# Patient Record
Sex: FEMALE | Race: WHITE | NOT HISPANIC OR LATINO
[De-identification: names, ages, dates, MRNs, and addresses within clinical notes are randomized per-mention and may not be internally consistent; named-entity substitution may affect disease eponyms.]

---

## 2021-11-22 PROBLEM — Z00.00 ENCOUNTER FOR PREVENTIVE HEALTH EXAMINATION: Status: ACTIVE | Noted: 2021-11-22

## 2022-03-30 ENCOUNTER — APPOINTMENT (OUTPATIENT)
Dept: ENDOCRINOLOGY | Facility: CLINIC | Age: 77
End: 2022-03-30

## 2022-06-24 ENCOUNTER — APPOINTMENT (OUTPATIENT)
Dept: NEUROLOGY | Facility: CLINIC | Age: 77
End: 2022-06-24

## 2022-07-07 ENCOUNTER — APPOINTMENT (OUTPATIENT)
Dept: ORTHOPEDIC SURGERY | Facility: CLINIC | Age: 77
End: 2022-07-07

## 2022-07-07 VITALS — WEIGHT: 150 LBS | HEIGHT: 61 IN | BODY MASS INDEX: 28.32 KG/M2

## 2022-07-07 DIAGNOSIS — M70.61 TROCHANTERIC BURSITIS, RIGHT HIP: ICD-10-CM

## 2022-07-07 DIAGNOSIS — M48.02 SPINAL STENOSIS, CERVICAL REGION: ICD-10-CM

## 2022-07-07 DIAGNOSIS — M70.62 TROCHANTERIC BURSITIS, LEFT HIP: ICD-10-CM

## 2022-07-07 DIAGNOSIS — M47.22 OTHER SPONDYLOSIS WITH RADICULOPATHY, CERVICAL REGION: ICD-10-CM

## 2022-07-07 DIAGNOSIS — M62.838 OTHER MUSCLE SPASM: ICD-10-CM

## 2022-07-07 DIAGNOSIS — M50.320 OTHER CERVICAL DISC DEGENERATION, MID-CERVICAL REGION, UNSPECIFIED LEVEL: ICD-10-CM

## 2022-07-07 DIAGNOSIS — M47.812 SPONDYLOSIS W/OUT MYELOPATHY OR RADICULOPATHY, CERVICAL REGION: ICD-10-CM

## 2022-07-07 DIAGNOSIS — M51.37 OTHER INTERVERTEBRAL DISC DEGENERATION, LUMBOSACRAL REGION: ICD-10-CM

## 2022-07-07 DIAGNOSIS — M48.061 SPINAL STENOSIS, LUMBAR REGION WITHOUT NEUROGENIC CLAUDICATION: ICD-10-CM

## 2022-07-07 DIAGNOSIS — M51.36 OTHER INTERVERTEBRAL DISC DEGENERATION, LUMBAR REGION: ICD-10-CM

## 2022-07-07 DIAGNOSIS — Z86.03 PERSONAL HISTORY OF NEOPLASM OF UNCERTAIN BEHAVIOR: ICD-10-CM

## 2022-07-07 DIAGNOSIS — M47.817 SPONDYLOSIS W/OUT MYELOPATHY OR RADICULOPATHY, LUMBOSACRAL REGION: ICD-10-CM

## 2022-07-07 PROCEDURE — 99214 OFFICE O/P EST MOD 30 MIN: CPT

## 2022-07-07 PROCEDURE — 99204 OFFICE O/P NEW MOD 45 MIN: CPT

## 2022-07-07 PROCEDURE — 72100 X-RAY EXAM L-S SPINE 2/3 VWS: CPT

## 2022-07-07 RX ORDER — ELECTROLYTE SOLUTION,INJ
VIAL (ML) INTRAVENOUS
Refills: 0 | Status: ACTIVE | COMMUNITY

## 2022-07-07 RX ORDER — PANTOPRAZOLE 40 MG/1
40 TABLET, DELAYED RELEASE ORAL
Refills: 0 | Status: ACTIVE | COMMUNITY

## 2022-07-07 RX ORDER — ROSUVASTATIN CALCIUM 20 MG/1
20 TABLET, FILM COATED ORAL
Refills: 0 | Status: ACTIVE | COMMUNITY

## 2022-07-07 NOTE — ASSESSMENT
[FreeTextEntry1] : Patient given prescription for MRI, follow up after study is completed to discuss results. \par \par Recommend: - NSAID - Heating pad - Muscle relaxer - Core strengthening exercise - Hamstring stretching exercise Patient is given back rehabilitation exercise book. \par \par Patient will begin physical therapy. \par \par Follow up with Rheumatology for osteoporosis treatment.
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
## 2022-09-28 ENCOUNTER — APPOINTMENT (OUTPATIENT)
Dept: ORTHOPEDIC SURGERY | Facility: CLINIC | Age: 77
End: 2022-09-28

## 2022-09-28 DIAGNOSIS — S80.01XA CONTUSION OF RIGHT KNEE, INITIAL ENCOUNTER: ICD-10-CM

## 2022-09-28 PROCEDURE — 99214 OFFICE O/P EST MOD 30 MIN: CPT

## 2022-09-28 PROCEDURE — 99072 ADDL SUPL MATRL&STAF TM PHE: CPT

## 2022-09-28 NOTE — HISTORY OF PRESENT ILLNESS
[de-identified] : Patient is here today for the right knee pain.  She injured the Right knee Right elbow and Right foot on 9/7/22 s/p trip and fall at work.  She denies prior injury. She went to urgent care and had xrays. She states the foot is frx and the Right elbow is feeling better but she still notes severe Right knee pain . Pain is mostly medial prox tibial area worse with flexion of the knee. She states she has been trying to walk on it.

## 2022-09-28 NOTE — WORK
[Was the competent medical cause of the injury] : was the competent medical cause of the injury [Are consistent with the injury] : are consistent with the injury [Consistent with my objective findings] : consistent with my objective findings

## 2022-09-28 NOTE — PHYSICAL EXAM
[Right] : right knee [Outside films reviewed] : Outside films reviewed [FreeTextEntry9] : minimal DJD. no frx noted.

## 2022-11-05 ENCOUNTER — OFFICE (OUTPATIENT)
Dept: URBAN - METROPOLITAN AREA CLINIC 88 | Facility: CLINIC | Age: 77
Setting detail: OPHTHALMOLOGY
End: 2022-11-05
Payer: MEDICARE

## 2022-11-05 DIAGNOSIS — H43.392: ICD-10-CM

## 2022-11-05 DIAGNOSIS — H34.8320: ICD-10-CM

## 2022-11-05 PROCEDURE — 92134 CPTRZ OPH DX IMG PST SGM RTA: CPT | Performed by: OPHTHALMOLOGY

## 2022-11-05 PROCEDURE — 67028 INJECTION EYE DRUG: CPT | Performed by: OPHTHALMOLOGY

## 2022-11-05 ASSESSMENT — KERATOMETRY
OD_K1POWER_DIOPTERS: 44.25
OD_K2POWER_DIOPTERS: 45.00
OS_AXISANGLE_DEGREES: 073
OD_AXISANGLE_DEGREES: 082
OS_K2POWER_DIOPTERS: 45.00
OS_K1POWER_DIOPTERS: 44.50

## 2022-11-05 ASSESSMENT — SPHEQUIV_DERIVED
OS_SPHEQUIV: 1.875
OD_SPHEQUIV: 2.5

## 2022-11-05 ASSESSMENT — REFRACTION_AUTOREFRACTION
OS_CYLINDER: -0.25
OD_CYLINDER: -0.50
OS_AXIS: 084
OD_SPHERE: +2.75
OS_SPHERE: +2.00
OD_AXIS: 112

## 2022-11-05 ASSESSMENT — LID EXAM ASSESSMENTS
OS_TRICHIASIS: LLL T
OS_COMMENTS: CONCRETION UL COMMENTS

## 2022-11-05 ASSESSMENT — AXIALLENGTH_DERIVED
OS_AL: 22.4594
OD_AL: 22.2817

## 2022-11-05 ASSESSMENT — SUPERFICIAL PUNCTATE KERATITIS (SPK)
OD_SPK: 1+
OS_SPK: 1+

## 2022-11-05 ASSESSMENT — VISUAL ACUITY
OS_BCVA: 20/25-2
OD_BCVA: 20/40+1

## 2022-11-05 ASSESSMENT — TONOMETRY
OS_IOP_MMHG: 15
OD_IOP_MMHG: 17

## 2022-11-05 ASSESSMENT — CONFRONTATIONAL VISUAL FIELD TEST (CVF)
OS_FINDINGS: FULL
OD_FINDINGS: FULL

## 2022-11-16 ENCOUNTER — APPOINTMENT (OUTPATIENT)
Dept: ORTHOPEDIC SURGERY | Facility: CLINIC | Age: 77
End: 2022-11-16

## 2022-11-16 DIAGNOSIS — S50.01XA CONTUSION OF RIGHT ELBOW, INITIAL ENCOUNTER: ICD-10-CM

## 2022-11-16 DIAGNOSIS — S80.11XA CONTUSION OF RIGHT LOWER LEG, INITIAL ENCOUNTER: ICD-10-CM

## 2022-11-16 PROCEDURE — 73562 X-RAY EXAM OF KNEE 3: CPT | Mod: RT

## 2022-11-16 PROCEDURE — 99072 ADDL SUPL MATRL&STAF TM PHE: CPT

## 2022-11-16 PROCEDURE — 99214 OFFICE O/P EST MOD 30 MIN: CPT

## 2022-11-16 NOTE — DISCUSSION/SUMMARY
[de-identified] : Patient will continue WBAT and f/u in 6-8 weeks. \par \par xrays today show mild DJD\par no restrictions\par f.u in 2 motnhs\par

## 2022-11-16 NOTE — PHYSICAL EXAM
[NL (150)] : flexion 150 degrees [NL (90)] : supination 90 degrees [NL (140)] : flexion 140 degrees [NL (0)] : extension 0 degrees [5___] : hamstring 5[unfilled]/5 [Right] : right knee [Outside films reviewed] : Outside films reviewed [] : no ecchymosis [FreeTextEntry3] : soft ball sized swelling to the Right med prox tibial region improved since last visit.  [FreeTextEntry9] : minimal DJD. no frx noted.

## 2022-11-16 NOTE — HISTORY OF PRESENT ILLNESS
[de-identified] : following up for the right knee. Patient fell on 9/07/2022  Patient states the Right elbow has improved since injury but she still notes pain medial aspect of the lower leg.  She states swelling has imrpoved. She is ambulating better. She is using epsom salts as well as  Voltaren gel.

## 2023-01-09 ENCOUNTER — APPOINTMENT (OUTPATIENT)
Dept: ORTHOPEDIC SURGERY | Facility: CLINIC | Age: 78
End: 2023-01-09

## 2023-06-24 ENCOUNTER — OFFICE (OUTPATIENT)
Dept: URBAN - METROPOLITAN AREA CLINIC 70 | Facility: CLINIC | Age: 78
Setting detail: OPHTHALMOLOGY
End: 2023-06-24
Payer: MEDICARE

## 2023-06-24 DIAGNOSIS — H25.13: ICD-10-CM

## 2023-06-24 DIAGNOSIS — H16.223: ICD-10-CM

## 2023-06-24 DIAGNOSIS — H16.222: ICD-10-CM

## 2023-06-24 DIAGNOSIS — H16.221: ICD-10-CM

## 2023-06-24 PROCEDURE — 99213 OFFICE O/P EST LOW 20 MIN: CPT | Performed by: OPHTHALMOLOGY

## 2023-06-24 PROCEDURE — 83861 MICROFLUID ANALY TEARS: CPT | Performed by: OPHTHALMOLOGY

## 2023-06-24 ASSESSMENT — REFRACTION_AUTOREFRACTION
OS_CYLINDER: -0.50
OS_AXIS: 097
OS_SPHERE: +2.50
OD_SPHERE: +3.00
OD_AXIS: 099
OD_CYLINDER: -0.50

## 2023-06-24 ASSESSMENT — KERATOMETRY
OS_K1POWER_DIOPTERS: 44.00
OD_AXISANGLE_DEGREES: 102
OS_K2POWER_DIOPTERS: 44.75
OD_K1POWER_DIOPTERS: 44.00
OD_K2POWER_DIOPTERS: 45.50
OS_AXISANGLE_DEGREES: 073

## 2023-06-24 ASSESSMENT — VISUAL ACUITY
OS_BCVA: 20/25
OD_BCVA: 20/60+2

## 2023-06-24 ASSESSMENT — SUPERFICIAL PUNCTATE KERATITIS (SPK)
OD_SPK: 1+
OS_SPK: 1+

## 2023-06-24 ASSESSMENT — TONOMETRY
OS_IOP_MMHG: 10
OD_IOP_MMHG: 12

## 2023-06-24 ASSESSMENT — CONFRONTATIONAL VISUAL FIELD TEST (CVF)
OD_FINDINGS: FULL
OS_FINDINGS: FULL

## 2023-06-24 ASSESSMENT — LID EXAM ASSESSMENTS
OS_TRICHIASIS: LLL T
OS_COMMENTS: CONCRETION UL COMMENTS

## 2023-06-24 ASSESSMENT — SPHEQUIV_DERIVED
OS_SPHEQUIV: 2.25
OD_SPHEQUIV: 2.75

## 2023-06-24 ASSESSMENT — AXIALLENGTH_DERIVED
OD_AL: 22.1546
OS_AL: 22.4518

## 2024-03-14 ENCOUNTER — OFFICE (OUTPATIENT)
Dept: URBAN - METROPOLITAN AREA CLINIC 104 | Facility: CLINIC | Age: 79
Setting detail: OPHTHALMOLOGY
End: 2024-03-14
Payer: MEDICARE

## 2024-03-14 DIAGNOSIS — H52.213: ICD-10-CM

## 2024-03-14 DIAGNOSIS — H16.223: ICD-10-CM

## 2024-03-14 DIAGNOSIS — H43.392: ICD-10-CM

## 2024-03-14 DIAGNOSIS — H25.13: ICD-10-CM

## 2024-03-14 DIAGNOSIS — H34.832: ICD-10-CM

## 2024-03-14 DIAGNOSIS — H25.12: ICD-10-CM

## 2024-03-14 PROBLEM — H25.11 CATARACT SENILE NUCLEAR SCLEROSIS; RIGHT EYE, LEFT EYE, BOTH EYES: Status: ACTIVE | Noted: 2024-03-14

## 2024-03-14 PROCEDURE — 92014 COMPRE OPH EXAM EST PT 1/>: CPT | Performed by: OPHTHALMOLOGY

## 2024-03-14 PROCEDURE — 92134 CPTRZ OPH DX IMG PST SGM RTA: CPT | Performed by: OPHTHALMOLOGY

## 2024-03-14 PROCEDURE — 92136 OPHTHALMIC BIOMETRY: CPT | Mod: 26,LT | Performed by: OPHTHALMOLOGY

## 2024-03-14 PROCEDURE — 92136 OPHTHALMIC BIOMETRY: CPT | Mod: TC | Performed by: OPHTHALMOLOGY

## 2024-03-14 PROCEDURE — 92025 CPTRIZED CORNEAL TOPOGRAPHY: CPT | Performed by: OPHTHALMOLOGY

## 2024-03-14 ASSESSMENT — REFRACTION_CURRENTRX
OS_CYLINDER: SPHERE
OS_ADD: +3.25
OD_OVR_VA: 20/
OS_VPRISM_DIRECTION: BF
OS_OVR_VA: 20/
OD_SPHERE: +2.75
OS_SPHERE: +2.00
OD_CYLINDER: -0.25
OD_ADD: +3.25
OD_VPRISM_DIRECTION: BF
OD_AXIS: 121
OS_AXIS: 000

## 2024-03-14 ASSESSMENT — REFRACTION_MANIFEST
OD_VA1: 20/NI
OD_SPHERE: +2.25
OS_VA1: 20/200
OD_CYLINDER: -0.50
OS_ADD: +2.75
OD_ADD: +2.75
OD_AXIS: 090
OS_AXIS: 120
OS_SPHERE: +0.50
OS_CYLINDER: -1.25

## 2024-03-14 ASSESSMENT — LID EXAM ASSESSMENTS
OS_TRICHIASIS: LLL T
OS_COMMENTS: CONCRETION UL COMMENTS

## 2024-03-14 ASSESSMENT — SPHEQUIV_DERIVED
OS_SPHEQUIV: -0.125
OD_SPHEQUIV: 2

## 2024-04-24 ASSESSMENT — KERATOMETRY
OD_K1POWER_DIOPTERS: 44.06
OS_K1POWER_DIOPTERS: 44.06
OD_AXISANGLE_DEGREES: 091
OS_K2POWER_DIOPTERS: 44.94
OS_AXISANGLE_DEGREES: 079
OD_K2POWER_DIOPTERS: 45.18

## 2024-04-24 ASSESSMENT — REFRACTION_CURRENTRX
OS_CYLINDER: SPHERE
OD_OVR_VA: 20/
OS_OVR_VA: 20/
OD_AXIS: 121
OS_VPRISM_DIRECTION: BF
OS_ADD: +3.25
OS_AXIS: 000
OS_SPHERE: +2.00
OD_VPRISM_DIRECTION: BF
OD_SPHERE: +2.75
OD_CYLINDER: -0.25
OD_ADD: +3.25

## 2024-04-24 ASSESSMENT — REFRACTION_MANIFEST
OD_SPHERE: +2.25
OD_CYLINDER: -0.50
OS_SPHERE: +0.50
OS_AXIS: 120
OS_ADD: +2.75
OD_VA1: 20/NI
OD_AXIS: 090
OS_CYLINDER: -1.25
OD_ADD: +2.75
OS_VA1: 20/200

## 2024-04-24 ASSESSMENT — AXIALLENGTH_DERIVED
OD_AL: 22.46
OS_AL: 23.2775

## 2024-04-24 ASSESSMENT — SPHEQUIV_DERIVED
OD_SPHEQUIV: 2
OS_SPHEQUIV: -0.125

## 2024-04-25 ENCOUNTER — RX ONLY (RX ONLY)
Age: 79
End: 2024-04-25

## 2024-05-06 ENCOUNTER — OFFICE (OUTPATIENT)
Dept: URBAN - METROPOLITAN AREA CLINIC 104 | Facility: CLINIC | Age: 79
Setting detail: OPHTHALMOLOGY
End: 2024-05-06
Payer: MEDICARE

## 2024-05-06 DIAGNOSIS — H25.11: ICD-10-CM

## 2024-05-06 DIAGNOSIS — H25.13: ICD-10-CM

## 2024-05-06 DIAGNOSIS — H25.12: ICD-10-CM

## 2024-05-06 PROCEDURE — 99212 OFFICE O/P EST SF 10 MIN: CPT | Performed by: OPHTHALMOLOGY

## 2024-05-06 ASSESSMENT — LID EXAM ASSESSMENTS
OS_TRICHIASIS: LLL T
OS_COMMENTS: CONCRETION UL COMMENTS

## 2024-06-07 ENCOUNTER — AMBULATORY SURGERY (OUTPATIENT)
Dept: URBAN - METROPOLITAN AREA CLINIC 18 | Facility: CLINIC | Age: 79
Setting detail: OPHTHALMOLOGY
End: 2024-06-07
Payer: MEDICARE

## 2024-06-07 DIAGNOSIS — Z96.1: ICD-10-CM

## 2024-06-07 DIAGNOSIS — H25.12: ICD-10-CM

## 2024-06-07 PROCEDURE — 66984 XCAPSL CTRC RMVL W/O ECP: CPT | Mod: LT | Performed by: OPHTHALMOLOGY

## 2024-06-07 PROCEDURE — 99024 POSTOP FOLLOW-UP VISIT: CPT | Performed by: OPTOMETRIST

## 2024-06-07 ASSESSMENT — LID EXAM ASSESSMENTS
OS_COMMENTS: CONCRETION UL COMMENTS
OS_TRICHIASIS: LLL T

## 2024-06-13 ENCOUNTER — OFFICE (OUTPATIENT)
Dept: URBAN - METROPOLITAN AREA CLINIC 104 | Facility: CLINIC | Age: 79
Setting detail: OPHTHALMOLOGY
End: 2024-06-13
Payer: MEDICARE

## 2024-06-13 DIAGNOSIS — Z96.1: ICD-10-CM

## 2024-06-13 PROBLEM — H16.222 DRY EYE SYNDROME K SICCA;  , LEFT EYE: Status: ACTIVE | Noted: 2024-06-07

## 2024-06-13 PROCEDURE — 99024 POSTOP FOLLOW-UP VISIT: CPT | Performed by: OPHTHALMOLOGY

## 2024-06-13 ASSESSMENT — CONFRONTATIONAL VISUAL FIELD TEST (CVF)
OD_FINDINGS: FULL
OS_FINDINGS: FULL

## 2024-06-13 ASSESSMENT — LID EXAM ASSESSMENTS
OS_TRICHIASIS: LLL T
OS_COMMENTS: CONCRETION UL COMMENTS

## 2024-07-11 ENCOUNTER — OFFICE (OUTPATIENT)
Dept: URBAN - METROPOLITAN AREA CLINIC 104 | Facility: CLINIC | Age: 79
Setting detail: OPHTHALMOLOGY
End: 2024-07-11
Payer: MEDICARE

## 2024-07-11 DIAGNOSIS — Z96.1: ICD-10-CM

## 2024-07-11 DIAGNOSIS — H16.223: ICD-10-CM

## 2024-07-11 DIAGNOSIS — H25.11: ICD-10-CM

## 2024-07-11 PROCEDURE — 99024 POSTOP FOLLOW-UP VISIT: CPT | Performed by: OPHTHALMOLOGY

## 2024-07-11 ASSESSMENT — LID EXAM ASSESSMENTS
OS_COMMENTS: CONCRETION UL COMMENTS
OS_TRICHIASIS: LLL T

## 2024-07-11 ASSESSMENT — CONFRONTATIONAL VISUAL FIELD TEST (CVF)
OD_FINDINGS: FULL
OS_FINDINGS: FULL

## 2024-09-06 ENCOUNTER — AMBUL SURGICAL CARE (OUTPATIENT)
Dept: URBAN - METROPOLITAN AREA CLINIC 18 | Facility: CLINIC | Age: 79
Setting detail: OPHTHALMOLOGY
End: 2024-09-06
Payer: MEDICARE

## 2024-09-06 ENCOUNTER — RX ONLY (RX ONLY)
Age: 79
End: 2024-09-06

## 2024-09-06 DIAGNOSIS — Z96.1: ICD-10-CM

## 2024-09-06 DIAGNOSIS — H25.11: ICD-10-CM

## 2024-09-06 PROBLEM — H25.811 CATARACT SENILE NUCLEAR SCLEROSIS; RIGHT EYE: Status: RESOLVED | Noted: 2024-09-06 | Resolved: 2024-09-06

## 2024-09-06 PROCEDURE — 66984 XCAPSL CTRC RMVL W/O ECP: CPT | Mod: 79,RT | Performed by: OPHTHALMOLOGY

## 2024-09-06 PROCEDURE — 99024 POSTOP FOLLOW-UP VISIT: CPT | Performed by: OPTOMETRIST

## 2024-09-06 ASSESSMENT — LID EXAM ASSESSMENTS
OS_TRICHIASIS: LLL T
OS_COMMENTS: CONCRETION UL COMMENTS

## 2024-09-06 ASSESSMENT — CONFRONTATIONAL VISUAL FIELD TEST (CVF)
OD_FINDINGS: FULL
OS_FINDINGS: FULL

## 2024-09-09 ENCOUNTER — OFFICE (OUTPATIENT)
Dept: URBAN - METROPOLITAN AREA CLINIC 104 | Facility: CLINIC | Age: 79
Setting detail: OPHTHALMOLOGY
End: 2024-09-09
Payer: MEDICARE

## 2024-09-09 DIAGNOSIS — Z96.1: ICD-10-CM

## 2024-09-09 PROCEDURE — 99024 POSTOP FOLLOW-UP VISIT: CPT | Performed by: OPTOMETRIST

## 2024-09-09 ASSESSMENT — LID EXAM ASSESSMENTS
OS_TRICHIASIS: LLL T
OS_COMMENTS: CONCRETION UL COMMENTS

## 2024-09-30 ENCOUNTER — OFFICE (OUTPATIENT)
Dept: URBAN - METROPOLITAN AREA CLINIC 104 | Facility: CLINIC | Age: 79
Setting detail: OPHTHALMOLOGY
End: 2024-09-30
Payer: MEDICARE

## 2024-09-30 DIAGNOSIS — Z96.1: ICD-10-CM

## 2024-09-30 PROCEDURE — 99024 POSTOP FOLLOW-UP VISIT: CPT | Performed by: OPHTHALMOLOGY

## 2024-09-30 ASSESSMENT — CONFRONTATIONAL VISUAL FIELD TEST (CVF)
OD_FINDINGS: FULL
OS_FINDINGS: FULL

## 2024-10-14 ENCOUNTER — OFFICE (OUTPATIENT)
Dept: URBAN - METROPOLITAN AREA CLINIC 104 | Facility: CLINIC | Age: 79
Setting detail: OPHTHALMOLOGY
End: 2024-10-14
Payer: MEDICARE

## 2024-10-14 DIAGNOSIS — Z96.1: ICD-10-CM

## 2024-10-14 PROCEDURE — 99024 POSTOP FOLLOW-UP VISIT: CPT | Performed by: OPHTHALMOLOGY

## 2024-10-14 ASSESSMENT — CONFRONTATIONAL VISUAL FIELD TEST (CVF)
OS_FINDINGS: FULL
OD_FINDINGS: FULL

## 2024-10-14 ASSESSMENT — KERATOMETRY
OD_K2POWER_DIOPTERS: 44.94
OS_K2POWER_DIOPTERS: 44.58
OS_K1POWER_DIOPTERS: 44.18
OD_AXISANGLE_DEGREES: 94
OS_AXISANGLE_DEGREES: 89
OD_K1POWER_DIOPTERS: 44.00

## 2024-10-14 ASSESSMENT — REFRACTION_AUTOREFRACTION
OS_AXIS: 167
OD_CYLINDER: -0.50
OS_SPHERE: -0.25
OS_CYLINDER: -0.50
OD_AXIS: 177
OD_SPHERE: +0.25

## 2024-10-14 ASSESSMENT — TONOMETRY
OD_IOP_MMHG: 10
OS_IOP_MMHG: 10

## 2024-10-14 ASSESSMENT — SUPERFICIAL PUNCTATE KERATITIS (SPK)
OD_SPK: 1+
OS_SPK: 1+

## 2024-10-14 ASSESSMENT — VISUAL ACUITY
OS_BCVA: 20/30
OD_BCVA: 20/40

## 2024-10-14 ASSESSMENT — LID EXAM ASSESSMENTS
OS_COMMENTS: CONCRETION UL COMMENTS
OS_TRICHIASIS: LLL T

## 2024-10-14 ASSESSMENT — CORNEAL DYSTROPHY - POSTERIOR
OD_POSTERIORDYSTROPHY: 1+ GUTTATA
OS_POSTERIORDYSTROPHY: 1+ GUTTATA

## 2024-12-10 ENCOUNTER — APPOINTMENT (OUTPATIENT)
Dept: ORTHOPEDIC SURGERY | Facility: CLINIC | Age: 79
End: 2024-12-10
Payer: MEDICARE

## 2024-12-10 VITALS — BODY MASS INDEX: 28.32 KG/M2 | WEIGHT: 150 LBS | HEIGHT: 61 IN

## 2024-12-10 DIAGNOSIS — M25.562 PAIN IN LEFT KNEE: ICD-10-CM

## 2024-12-10 DIAGNOSIS — Z78.9 OTHER SPECIFIED HEALTH STATUS: ICD-10-CM

## 2024-12-10 DIAGNOSIS — S83.272A COMPLEX TEAR OF LATERAL MENISCUS, CURRENT INJURY, LEFT KNEE, INITIAL ENCOUNTER: ICD-10-CM

## 2024-12-10 DIAGNOSIS — G89.29 PAIN IN LEFT KNEE: ICD-10-CM

## 2024-12-10 DIAGNOSIS — Z86.39 PERSONAL HISTORY OF OTHER ENDOCRINE, NUTRITIONAL AND METABOLIC DISEASE: ICD-10-CM

## 2024-12-10 DIAGNOSIS — S83.232A COMPLEX TEAR OF MEDIAL MENISCUS, CURRENT INJURY, LEFT KNEE, INITIAL ENCOUNTER: ICD-10-CM

## 2024-12-10 DIAGNOSIS — Z87.19 PERSONAL HISTORY OF OTHER DISEASES OF THE DIGESTIVE SYSTEM: ICD-10-CM

## 2024-12-10 DIAGNOSIS — H35.81 RETINAL EDEMA: ICD-10-CM

## 2024-12-10 DIAGNOSIS — M94.262 CHONDROMALACIA, LEFT KNEE: ICD-10-CM

## 2024-12-10 DIAGNOSIS — Z87.891 PERSONAL HISTORY OF NICOTINE DEPENDENCE: ICD-10-CM

## 2024-12-10 DIAGNOSIS — Z87.898 PERSONAL HISTORY OF OTHER SPECIFIED CONDITIONS: ICD-10-CM

## 2024-12-10 DIAGNOSIS — I10 ESSENTIAL (PRIMARY) HYPERTENSION: ICD-10-CM

## 2024-12-10 PROCEDURE — 73564 X-RAY EXAM KNEE 4 OR MORE: CPT | Mod: LT

## 2024-12-10 PROCEDURE — 99204 OFFICE O/P NEW MOD 45 MIN: CPT

## 2024-12-10 PROCEDURE — 99214 OFFICE O/P EST MOD 30 MIN: CPT

## 2025-01-23 ENCOUNTER — OFFICE (OUTPATIENT)
Dept: URBAN - METROPOLITAN AREA CLINIC 104 | Facility: CLINIC | Age: 80
Setting detail: OPHTHALMOLOGY
End: 2025-01-23
Payer: MEDICARE

## 2025-01-23 DIAGNOSIS — Z96.1: ICD-10-CM

## 2025-01-23 DIAGNOSIS — H34.8320: ICD-10-CM

## 2025-01-23 DIAGNOSIS — H16.223: ICD-10-CM

## 2025-01-23 DIAGNOSIS — S05.22XS: ICD-10-CM

## 2025-01-23 DIAGNOSIS — H11.122: ICD-10-CM

## 2025-01-23 PROCEDURE — 92250 FUNDUS PHOTOGRAPHY W/I&R: CPT | Performed by: OPHTHALMOLOGY

## 2025-01-23 PROCEDURE — 99213 OFFICE O/P EST LOW 20 MIN: CPT | Performed by: OPHTHALMOLOGY

## 2025-01-23 ASSESSMENT — REFRACTION_MANIFEST
OD_VA1: 20/20-2
OS_SPHERE: -0.25
OS_VA1: 20/30
OD_SPHERE: +0.75
OD_AXIS: 155
OS_CYLINDER: SPH
OD_CYLINDER: -0.25

## 2025-01-23 ASSESSMENT — KERATOMETRY
OS_K2POWER_DIOPTERS: 45.12
OS_AXISANGLE_DEGREES: 079
OD_AXISANGLE_DEGREES: 100
OD_K2POWER_DIOPTERS: 45.12
OS_K1POWER_DIOPTERS: 44.06
OD_K1POWER_DIOPTERS: 44.06

## 2025-01-23 ASSESSMENT — CONFRONTATIONAL VISUAL FIELD TEST (CVF)
OD_FINDINGS: FULL
OS_FINDINGS: FULL

## 2025-01-23 ASSESSMENT — REFRACTION_AUTOREFRACTION
OD_SPHERE: +0.75
OD_AXIS: 153
OD_CYLINDER: -0.50
OS_AXIS: 021
OS_CYLINDER: -0.50
OS_SPHERE: -0.50

## 2025-01-23 ASSESSMENT — LID EXAM ASSESSMENTS
OS_TRICHIASIS: LLL T
OS_COMMENTS: CONCRETION UL COMMENTS

## 2025-01-23 ASSESSMENT — CORNEAL DYSTROPHY - POSTERIOR
OS_POSTERIORDYSTROPHY: 1+ GUTTATA
OD_POSTERIORDYSTROPHY: 1+ GUTTATA

## 2025-01-23 ASSESSMENT — VISUAL ACUITY
OD_BCVA: 20/30
OS_BCVA: 20/30

## 2025-01-23 ASSESSMENT — SUPERFICIAL PUNCTATE KERATITIS (SPK)
OD_SPK: 1+
OS_SPK: 1+

## 2025-01-23 ASSESSMENT — TONOMETRY
OD_IOP_MMHG: 10
OS_IOP_MMHG: 10

## 2025-04-24 ENCOUNTER — OFFICE (OUTPATIENT)
Dept: URBAN - METROPOLITAN AREA CLINIC 104 | Facility: CLINIC | Age: 80
Setting detail: OPHTHALMOLOGY
End: 2025-04-24
Payer: MEDICARE

## 2025-04-24 DIAGNOSIS — Z96.1: ICD-10-CM

## 2025-04-24 DIAGNOSIS — S05.22XS: ICD-10-CM

## 2025-04-24 DIAGNOSIS — H16.223: ICD-10-CM

## 2025-04-24 DIAGNOSIS — H34.8320: ICD-10-CM

## 2025-04-24 DIAGNOSIS — H11.122: ICD-10-CM

## 2025-04-24 PROCEDURE — 92134 CPTRZ OPH DX IMG PST SGM RTA: CPT | Performed by: OPHTHALMOLOGY

## 2025-04-24 PROCEDURE — 99213 OFFICE O/P EST LOW 20 MIN: CPT | Performed by: OPHTHALMOLOGY

## 2025-04-24 ASSESSMENT — REFRACTION_AUTOREFRACTION
OD_CYLINDER: -0.75
OD_SPHERE: +1.25
OS_AXIS: 173
OD_AXIS: 102
OS_SPHERE: -0.50
OS_CYLINDER: -1.00

## 2025-04-24 ASSESSMENT — LID EXAM ASSESSMENTS
OS_TRICHIASIS: LLL T
OS_COMMENTS: CONCRETION UL COMMENTS

## 2025-04-24 ASSESSMENT — REFRACTION_MANIFEST
OS_SPHERE: -0.25
OS_CYLINDER: SPH
OD_AXIS: 155
OD_CYLINDER: -0.25
OD_SPHERE: +0.75
OD_VA1: 20/20-2
OS_VA1: 20/30

## 2025-04-24 ASSESSMENT — CONFRONTATIONAL VISUAL FIELD TEST (CVF)
OD_FINDINGS: FULL
OS_FINDINGS: FULL

## 2025-04-24 ASSESSMENT — KERATOMETRY
OD_AXISANGLE_DEGREES: 87
OS_K1POWER_DIOPTERS: 44.12
OD_K2POWER_DIOPTERS: 44.70
OD_K1POWER_DIOPTERS: 43.95
OS_AXISANGLE_DEGREES: 83
OS_K2POWER_DIOPTERS: 44.58

## 2025-04-24 ASSESSMENT — REFRACTION_CURRENTRX
OS_OVR_VA: 20/
OS_SPHERE: +2.50
OD_OVR_VA: 20/
OD_SPHERE: +2.50

## 2025-04-24 ASSESSMENT — CORNEAL DYSTROPHY - POSTERIOR
OD_POSTERIORDYSTROPHY: 1+ GUTTATA
OS_POSTERIORDYSTROPHY: 1+ GUTTATA

## 2025-04-24 ASSESSMENT — VISUAL ACUITY
OS_BCVA: 20/20-2
OD_BCVA: 20/30-1

## 2025-04-24 ASSESSMENT — SUPERFICIAL PUNCTATE KERATITIS (SPK)
OS_SPK: 1+
OD_SPK: 1+

## 2025-07-21 ENCOUNTER — OFFICE (OUTPATIENT)
Dept: URBAN - METROPOLITAN AREA CLINIC 104 | Facility: CLINIC | Age: 80
Setting detail: OPHTHALMOLOGY
End: 2025-07-21
Payer: MEDICARE

## 2025-07-21 DIAGNOSIS — H26.492: ICD-10-CM

## 2025-07-21 DIAGNOSIS — H16.223: ICD-10-CM

## 2025-07-21 DIAGNOSIS — H34.8320: ICD-10-CM

## 2025-07-21 DIAGNOSIS — H11.122: ICD-10-CM

## 2025-07-21 DIAGNOSIS — Z96.1: ICD-10-CM

## 2025-07-21 DIAGNOSIS — S05.22XS: ICD-10-CM

## 2025-07-21 PROCEDURE — 99213 OFFICE O/P EST LOW 20 MIN: CPT | Performed by: OPHTHALMOLOGY

## 2025-07-21 ASSESSMENT — REFRACTION_MANIFEST
OD_CYLINDER: -0.25
OD_SPHERE: +0.75
OS_SPHERE: -0.25
OS_CYLINDER: SPH
OD_AXIS: 155
OS_VA1: 20/30
OD_VA1: 20/20-2

## 2025-07-21 ASSESSMENT — CONFRONTATIONAL VISUAL FIELD TEST (CVF)
OD_FINDINGS: FULL
OS_FINDINGS: FULL

## 2025-07-21 ASSESSMENT — KERATOMETRY
OS_K1POWER_DIOPTERS: 44.18
OD_AXISANGLE_DEGREES: 093
OD_K2POWER_DIOPTERS: 44.58
OD_K1POWER_DIOPTERS: 44.29
OS_AXISANGLE_DEGREES: 083
OS_K2POWER_DIOPTERS: 44.76

## 2025-07-21 ASSESSMENT — REFRACTION_AUTOREFRACTION
OD_SPHERE: +0.50
OD_CYLINDER: -0.50
OD_AXIS: 140
OS_SPHERE: -0.50
OS_AXIS: 006
OS_CYLINDER: -0.50

## 2025-07-21 ASSESSMENT — REFRACTION_CURRENTRX
OD_OVR_VA: 20/
OS_SPHERE: +2.50
OS_OVR_VA: 20/
OD_SPHERE: +2.50

## 2025-07-21 ASSESSMENT — LID EXAM ASSESSMENTS
OS_TRICHIASIS: LLL T
OS_COMMENTS: CONCRETION UL COMMENTS

## 2025-07-21 ASSESSMENT — CORNEAL DYSTROPHY - POSTERIOR
OD_POSTERIORDYSTROPHY: 1+ GUTTATA
OS_POSTERIORDYSTROPHY: 1+ GUTTATA

## 2025-07-21 ASSESSMENT — SUPERFICIAL PUNCTATE KERATITIS (SPK)
OD_SPK: 1+
OS_SPK: 1+

## 2025-07-21 ASSESSMENT — VISUAL ACUITY
OD_BCVA: 20/40
OS_BCVA: 20/25

## 2025-08-04 ENCOUNTER — OFFICE (OUTPATIENT)
Dept: URBAN - METROPOLITAN AREA CLINIC 104 | Facility: CLINIC | Age: 80
Setting detail: OPHTHALMOLOGY
End: 2025-08-04
Payer: MEDICARE

## 2025-08-04 ENCOUNTER — RX ONLY (RX ONLY)
Age: 80
End: 2025-08-04

## 2025-08-04 DIAGNOSIS — H26.492: ICD-10-CM

## 2025-08-04 PROCEDURE — 66821 AFTER CATARACT LASER SURGERY: CPT | Mod: LT | Performed by: OPHTHALMOLOGY

## 2025-08-04 ASSESSMENT — REFRACTION_CURRENTRX
OS_SPHERE: +2.50
OD_SPHERE: +2.50
OS_OVR_VA: 20/
OD_OVR_VA: 20/

## 2025-08-04 ASSESSMENT — CONFRONTATIONAL VISUAL FIELD TEST (CVF)
OD_FINDINGS: FULL
OS_FINDINGS: FULL

## 2025-08-04 ASSESSMENT — KERATOMETRY
OS_AXISANGLE_DEGREES: 083
OD_AXISANGLE_DEGREES: 093
OS_K1POWER_DIOPTERS: 44.18
OS_K2POWER_DIOPTERS: 44.76
OD_K1POWER_DIOPTERS: 44.29
OD_K2POWER_DIOPTERS: 44.58

## 2025-08-04 ASSESSMENT — REFRACTION_AUTOREFRACTION
OD_SPHERE: +0.50
OS_AXIS: 006
OS_SPHERE: -0.50
OD_CYLINDER: -0.50
OD_AXIS: 140
OS_CYLINDER: -0.50

## 2025-08-04 ASSESSMENT — REFRACTION_MANIFEST
OS_SPHERE: -0.25
OD_SPHERE: +0.75
OD_VA1: 20/20-2
OS_CYLINDER: SPH
OD_AXIS: 155
OD_CYLINDER: -0.25
OS_VA1: 20/30

## 2025-08-04 ASSESSMENT — VISUAL ACUITY
OS_BCVA: 20/25-
OD_BCVA: 20/50